# Patient Record
Sex: MALE | Race: WHITE | NOT HISPANIC OR LATINO | Employment: FULL TIME | URBAN - METROPOLITAN AREA
[De-identification: names, ages, dates, MRNs, and addresses within clinical notes are randomized per-mention and may not be internally consistent; named-entity substitution may affect disease eponyms.]

---

## 2024-02-07 ENCOUNTER — HOSPITAL ENCOUNTER (EMERGENCY)
Facility: HOSPITAL | Age: 44
Discharge: HOME | End: 2024-02-07
Attending: EMERGENCY MEDICINE
Payer: MEDICAID

## 2024-02-07 VITALS
BODY MASS INDEX: 20.32 KG/M2 | SYSTOLIC BLOOD PRESSURE: 118 MMHG | RESPIRATION RATE: 18 BRPM | DIASTOLIC BLOOD PRESSURE: 72 MMHG | WEIGHT: 150 LBS | OXYGEN SATURATION: 98 % | HEIGHT: 72 IN | HEART RATE: 82 BPM | TEMPERATURE: 99.3 F

## 2024-02-07 DIAGNOSIS — S05.01XA ABRASION OF RIGHT CORNEA, INITIAL ENCOUNTER: Primary | ICD-10-CM

## 2024-02-07 PROCEDURE — 99283 EMERGENCY DEPT VISIT LOW MDM: CPT

## 2024-02-07 PROCEDURE — 2500000001 HC RX 250 WO HCPCS SELF ADMINISTERED DRUGS (ALT 637 FOR MEDICARE OP)

## 2024-02-07 PROCEDURE — 2500000001 HC RX 250 WO HCPCS SELF ADMINISTERED DRUGS (ALT 637 FOR MEDICARE OP): Performed by: EMERGENCY MEDICINE

## 2024-02-07 RX ORDER — TETRACAINE HYDROCHLORIDE 5 MG/ML
SOLUTION OPHTHALMIC
Status: COMPLETED
Start: 2024-02-07 | End: 2024-02-07

## 2024-02-07 RX ORDER — ERYTHROMYCIN 5 MG/G
OINTMENT OPHTHALMIC EVERY 6 HOURS
Qty: 3 G | Refills: 0 | Status: SHIPPED | OUTPATIENT
Start: 2024-02-07 | End: 2024-02-17

## 2024-02-07 RX ORDER — ERYTHROMYCIN 5 MG/G
OINTMENT OPHTHALMIC
Status: COMPLETED
Start: 2024-02-07 | End: 2024-02-07

## 2024-02-07 RX ORDER — ERYTHROMYCIN 5 MG/G
1 OINTMENT OPHTHALMIC ONCE
Status: COMPLETED | OUTPATIENT
Start: 2024-02-07 | End: 2024-02-07

## 2024-02-07 RX ORDER — TETRACAINE HYDROCHLORIDE 5 MG/ML
2 SOLUTION OPHTHALMIC ONCE
Status: COMPLETED | OUTPATIENT
Start: 2024-02-07 | End: 2024-02-07

## 2024-02-07 RX ADMIN — ERYTHROMYCIN 1 CM: 5 OINTMENT OPHTHALMIC at 22:31

## 2024-02-07 RX ADMIN — FLUORESCEIN SODIUM 1 STRIP: 1 STRIP OPHTHALMIC at 22:28

## 2024-02-07 RX ADMIN — TETRACAINE HYDROCHLORIDE 2 DROP: 5 SOLUTION OPHTHALMIC at 22:28

## 2024-02-07 ASSESSMENT — COLUMBIA-SUICIDE SEVERITY RATING SCALE - C-SSRS
1. IN THE PAST MONTH, HAVE YOU WISHED YOU WERE DEAD OR WISHED YOU COULD GO TO SLEEP AND NOT WAKE UP?: NO
6. HAVE YOU EVER DONE ANYTHING, STARTED TO DO ANYTHING, OR PREPARED TO DO ANYTHING TO END YOUR LIFE?: NO
2. HAVE YOU ACTUALLY HAD ANY THOUGHTS OF KILLING YOURSELF?: NO

## 2024-02-07 ASSESSMENT — PAIN DESCRIPTION - ORIENTATION: ORIENTATION: RIGHT

## 2024-02-07 ASSESSMENT — PAIN DESCRIPTION - DESCRIPTORS: DESCRIPTORS: BURNING

## 2024-02-07 ASSESSMENT — PAIN DESCRIPTION - PAIN TYPE: TYPE: ACUTE PAIN

## 2024-02-07 ASSESSMENT — PAIN - FUNCTIONAL ASSESSMENT: PAIN_FUNCTIONAL_ASSESSMENT: 0-10

## 2024-02-07 ASSESSMENT — PAIN DESCRIPTION - LOCATION: LOCATION: EYE

## 2024-02-07 ASSESSMENT — PAIN SCALES - GENERAL: PAINLEVEL_OUTOF10: 10 - WORST POSSIBLE PAIN

## 2024-02-07 ASSESSMENT — PAIN DESCRIPTION - FREQUENCY: FREQUENCY: CONSTANT/CONTINUOUS

## 2024-02-08 NOTE — ED PROVIDER NOTES
Chief Complaint   Patient presents with    Foreign Body in Eye     1630 was heating metal when part exploded and FB to rt eye  went to [facility] sent here   last tetanus unknown  states unable to open rt eye and some pain lt eye        43-year-old male arrives to the emergency department the chief complaint of a right eye injury.  The patient was working on a large diesel truck, when another  was heating a stainless steel bung of an oxygen sensor, as the patient attempted to remove the oxygen sensor, the stainless steel shattered, with a piece striking him in his right eye, this was not red hot at the time.  Since that time approximately 5 hours prior to arrival to the emergency department, the patient continued to have a 10 out of 10 pain and a foreign body sensation, the patient attempted to go to the urgent care and they sent him to the emergency department.  The patient is still able to open the right eye secondary to discomfort.  The patient's right eye is injected red and tearing.  Other than the tearing, there is no drainage appreciated out of the patient's right eye.  The patient's left eye is unaffected.  Patient denies any other symptoms or complaints      History provided by:  Patient   used: No         PmHx, PsHx, Allergies, Family Hx, social Hx reviewed as documented    Given the focused nature of the complaint, only related components review of systems were evaluated, and abnormalities indicated in HPI    Physical Exam:    General: Patient is AAOx3, appears well developed, well nourished, is a good historian, answers questions appropriately    HEENT: head normocephalic, atraumatic, PERRLA, right eye injected, tearing, painful to open, difficult to assess vision due to inability to maintain an open eye.  EOMs intact, oropharynx without erythema or exudate, buccal mucosa intact without lesions, TMs unremarkable, nose is patent bilateral    Pulmonary: CTAB, no accessory  muscle use, able to speak full clear sentences    Cardiac: HRRR, no murmurs, rubs or gallops    GI: soft, non-tender, non-distended    Musculoskeletal: full weight bearing, PORTER, no joint effusions, clubbing or edema noted    Skin: intact, no lesions or rashes noted, turgor is good.    Medical Decision Making  This patient was seen, treated, and evaluated in conjunction with Dr. Michael    Procedure: 2 drops of tetracaine was instilled in the patient's right eye, patient tolerated well, approximately 1 minute later the patient received anesthetic state in the right eye.  A fluorescein strip was used as the patient was blinking the eye was stained.  The lights in the room were dimmed and a Woods lamp was used to inspect the eye.  A significant corneal abrasion was appreciated to the medial aspect of the iris consuming more than 50% of the iris and extending inferior and superior to the iris.    The attending physician was consulted, he did a further exam with the slit-lamp, not revealing any globe injury.  Patient given his first dose of erythromycin ointment and a prescription for the same at home.    Patient is amenable to the plan of discharge as outlined above, all patient's questions pertaining to their ED course were answered in their entirety.  Strict return precautions were discussed with the patient and they verbalized understanding.  Further, it was made clear to the patient that from an emergent basis, all effort and testing was done to eliminate any imminent dangerous or potentially dangerous conditions of the patient however if their symptoms get much worse or feel life-threatening, they are to return to the emergency department or call 911 immediately.       Diagnoses as of 02/07/24 2325   Abrasion of right cornea, initial encounter       The patient has had the following imaging during this ER visit: None     Patient History   No past medical history on file.  No past surgical history on file.  No  family history on file.  Social History     Tobacco Use    Smoking status: Not on file    Smokeless tobacco: Not on file   Substance Use Topics    Alcohol use: Not on file    Drug use: Not on file       ED Triage Vitals [02/07/24 2112]   Temperature Heart Rate Respirations BP   37.4 °C (99.3 °F) 82 18 118/72      Pulse Ox Temp Source Heart Rate Source Patient Position   98 % Tympanic Monitor Sitting      BP Location FiO2 (%)     Left arm --       Vitals:    02/07/24 2112   BP: 118/72   BP Location: Left arm   Patient Position: Sitting   Pulse: 82   Resp: 18   Temp: 37.4 °C (99.3 °F)   TempSrc: Tympanic   SpO2: 98%   Weight: 68 kg (150 lb)   Height: 1.829 m (6')               JOSELINE Hancock-RALEIGH  02/07/24 8610